# Patient Record
Sex: MALE | Race: WHITE | NOT HISPANIC OR LATINO | Employment: OTHER | URBAN - NONMETROPOLITAN AREA
[De-identification: names, ages, dates, MRNs, and addresses within clinical notes are randomized per-mention and may not be internally consistent; named-entity substitution may affect disease eponyms.]

---

## 2023-12-27 PROBLEM — J44.9 COPD (CHRONIC OBSTRUCTIVE PULMONARY DISEASE) (MULTI): Status: ACTIVE | Noted: 2023-12-27

## 2023-12-27 PROBLEM — I47.10 PAROXYSMAL SVT (SUPRAVENTRICULAR TACHYCARDIA) (CMS-HCC): Status: ACTIVE | Noted: 2023-12-27

## 2023-12-27 PROBLEM — I10 ESSENTIAL HYPERTENSION: Status: ACTIVE | Noted: 2023-12-27

## 2023-12-27 RX ORDER — CLOPIDOGREL BISULFATE 75 MG/1
75 TABLET ORAL DAILY
COMMUNITY

## 2023-12-27 RX ORDER — OMEPRAZOLE 20 MG/1
20 CAPSULE, DELAYED RELEASE ORAL
COMMUNITY

## 2023-12-27 RX ORDER — CLONAZEPAM 0.5 MG/1
1 TABLET ORAL 3 TIMES DAILY
COMMUNITY

## 2023-12-27 RX ORDER — SILDENAFIL CITRATE 20 MG/1
100 TABLET ORAL AS NEEDED
COMMUNITY

## 2023-12-27 RX ORDER — ALOGLIPTIN 25 MG/1
25 TABLET, FILM COATED ORAL DAILY
COMMUNITY

## 2023-12-27 RX ORDER — ROSUVASTATIN CALCIUM 20 MG/1
20 TABLET, COATED ORAL DAILY
COMMUNITY

## 2023-12-27 RX ORDER — FERROUS SULFATE 325(65) MG
1 TABLET ORAL EVERY OTHER DAY
COMMUNITY

## 2023-12-27 RX ORDER — LISINOPRIL 20 MG/1
10 TABLET ORAL DAILY
COMMUNITY

## 2023-12-27 RX ORDER — TAMSULOSIN HYDROCHLORIDE 0.4 MG/1
0.4 CAPSULE ORAL DAILY
COMMUNITY
End: 2024-02-02

## 2023-12-27 RX ORDER — FLUTICASONE PROPIONATE 50 MCG
1 SPRAY, SUSPENSION (ML) NASAL DAILY
COMMUNITY

## 2023-12-27 RX ORDER — AMLODIPINE BESYLATE 10 MG/1
10 TABLET ORAL DAILY
COMMUNITY
End: 2024-02-02

## 2023-12-27 RX ORDER — FINASTERIDE 5 MG/1
5 TABLET, FILM COATED ORAL DAILY
COMMUNITY

## 2023-12-27 RX ORDER — ALBUTEROL SULFATE 90 UG/1
2 AEROSOL, METERED RESPIRATORY (INHALATION)
COMMUNITY

## 2024-01-11 ENCOUNTER — TELEPHONE (OUTPATIENT)
Dept: CARDIOLOGY | Facility: CLINIC | Age: 74
End: 2024-01-11
Payer: OTHER GOVERNMENT

## 2024-01-17 ENCOUNTER — APPOINTMENT (OUTPATIENT)
Dept: CARDIOLOGY | Facility: CLINIC | Age: 74
End: 2024-01-17
Payer: MEDICARE

## 2024-02-02 ENCOUNTER — OFFICE VISIT (OUTPATIENT)
Dept: CARDIOLOGY | Facility: CLINIC | Age: 74
End: 2024-02-02
Payer: MEDICARE

## 2024-02-02 VITALS
SYSTOLIC BLOOD PRESSURE: 120 MMHG | WEIGHT: 190 LBS | DIASTOLIC BLOOD PRESSURE: 80 MMHG | HEART RATE: 72 BPM | HEIGHT: 73 IN | BODY MASS INDEX: 25.18 KG/M2

## 2024-02-02 DIAGNOSIS — E78.2 MIXED HYPERLIPIDEMIA: ICD-10-CM

## 2024-02-02 DIAGNOSIS — E13.9 DIABETES MELLITUS OF OTHER TYPE WITHOUT COMPLICATION, UNSPECIFIED WHETHER LONG TERM INSULIN USE (MULTI): ICD-10-CM

## 2024-02-02 DIAGNOSIS — I65.23 CAROTID STENOSIS, BILATERAL: ICD-10-CM

## 2024-02-02 DIAGNOSIS — F17.200 CURRENT SMOKER: ICD-10-CM

## 2024-02-02 DIAGNOSIS — I47.10 PAROXYSMAL SVT (SUPRAVENTRICULAR TACHYCARDIA) (CMS-HCC): ICD-10-CM

## 2024-02-02 DIAGNOSIS — I10 ESSENTIAL HYPERTENSION: ICD-10-CM

## 2024-02-02 PROBLEM — E11.9 DIABETES MELLITUS (MULTI): Status: ACTIVE | Noted: 2024-02-02

## 2024-02-02 PROBLEM — R93.1 ABNORMAL ECHOCARDIOGRAM: Status: ACTIVE | Noted: 2024-02-02

## 2024-02-02 PROCEDURE — 3079F DIAST BP 80-89 MM HG: CPT | Performed by: INTERNAL MEDICINE

## 2024-02-02 PROCEDURE — 1159F MED LIST DOCD IN RCRD: CPT | Performed by: INTERNAL MEDICINE

## 2024-02-02 PROCEDURE — 99214 OFFICE O/P EST MOD 30 MIN: CPT | Performed by: INTERNAL MEDICINE

## 2024-02-02 PROCEDURE — 3074F SYST BP LT 130 MM HG: CPT | Performed by: INTERNAL MEDICINE

## 2024-02-02 PROCEDURE — 4010F ACE/ARB THERAPY RXD/TAKEN: CPT | Performed by: INTERNAL MEDICINE

## 2024-02-02 NOTE — PROGRESS NOTES
"Subjective   Jake Graves is a 73 y.o. male       Chief Complaint    Follow-up          HPI   Patient has seen previously Dr. Hebert and this is the first time I see him in the office.  I take care of his wife Pietro.  He has remote history of SVT with no recurrences has no organic heart disease based on previous echocardiogram.  He has no symptoms of chest pain orthopnea PND or lower extremity edema.  He is heavy tobacco user up to 2 packs daily.  He used to be in the .  He has no desire to quit smoking.  Apparently has occluded right internal carotid artery leading to stroke in the past and he follows with vascular surgery for that.  His pressures under excellent control.  His lipids have been managed by his PCP Dr. Roberto de leon.  His lungs sounded normal and his cardiac examination was normal.  He had no carotid bruits.    Assessment/recommendations:    1-history of SVT in the past self-limited with no recurrences.  Will monitor for now  2-history of stroke caused by right carotid occlusion followed by vascular surgery with mild residual deficit.  He is on antiplatelet therapy and high intensity statin.  3-active tobacco use.  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  5-hyperlipidemia on high intensity statin followed by PCP.  Lab data from recent testing were requested  Review of Systems   All other systems reviewed and are negative.         Visit Vitals  /80 (BP Location: Left arm, Patient Position: Sitting)   Pulse 72   Ht 1.854 m (6' 1\")   Wt 86.2 kg (190 lb)   BMI 25.07 kg/m²   Smoking Status Every Day   BSA 2.11 m²         Objective   Physical Exam  Constitutional:       Appearance: Normal appearance. He is normal weight.   HENT:      Nose: Nose normal.   Neck:      Vascular: No carotid bruit.   Cardiovascular:      Rate and Rhythm: Normal rate.      Pulses: Normal pulses.      Heart sounds: Normal heart " sounds.   Pulmonary:      Effort: Pulmonary effort is normal.   Abdominal:      General: Bowel sounds are normal.      Palpations: Abdomen is soft.   Genitourinary:     Rectum: Normal.   Musculoskeletal:         General: Normal range of motion.      Cervical back: Normal range of motion.      Right lower leg: No edema.      Left lower leg: No edema.   Skin:     General: Skin is warm and dry.   Neurological:      General: No focal deficit present.      Mental Status: He is alert.   Psychiatric:         Mood and Affect: Mood normal.         Behavior: Behavior normal.         Thought Content: Thought content normal.         Judgment: Judgment normal.         Current Medications    Current Outpatient Medications:     albuterol (ProAir HFA) 90 mcg/actuation inhaler, Inhale 2 puffs 4 times a day., Disp: , Rfl:     alogliptin (Nesina) 25 mg tablet, Take 1 tablet (25 mg) by mouth once daily., Disp: , Rfl:     calcium citrate-vitamin D2 250 mg-2.5 mcg (100 unit) tablet, Take 1 tablet by mouth 2 times a day., Disp: , Rfl:     clonazePAM (KlonoPIN) 0.5 mg tablet, Take 1 tablet (0.5 mg) by mouth 3 times a day., Disp: , Rfl:     clopidogrel (Plavix) 75 mg tablet, Take 1 tablet (75 mg) by mouth once daily., Disp: , Rfl:     ferrous sulfate, 325 mg ferrous sulfate, tablet, Take 1 tablet by mouth every other day., Disp: , Rfl:     finasteride (Proscar) 5 mg tablet, Take 1 tablet (5 mg) by mouth once daily., Disp: , Rfl:     fish oil concentrate (Omega-3) 120-180 mg capsule, Take 1 capsule (1 g) by mouth once daily., Disp: , Rfl:     fluticasone (Flonase) 50 mcg/actuation nasal spray, Administer 1 spray into each nostril once daily., Disp: , Rfl:     lisinopril 20 mg tablet, Take 0.5 tablets (10 mg) by mouth once daily., Disp: , Rfl:     multivitamin-Ca-iron-minerals (Tab-A-Annia Womens) 27-0.4 mg tablet, Take 1 tablet by mouth once daily., Disp: , Rfl:     omeprazole (PriLOSEC) 20 mg DR capsule, Take 1 capsule (20 mg) by mouth once  daily in the morning. Take before meals., Disp: , Rfl:     rosuvastatin (Crestor) 20 mg tablet, Take 1 tablet (20 mg) by mouth once daily., Disp: , Rfl:     sildenafil (Revatio) 20 mg tablet, Take 5 tablets (100 mg) by mouth if needed., Disp: , Rfl:                      Assessment/Plan   1. Paroxysmal SVT (supraventricular tachycardia)  Follow Up In Cardiology      2. Essential hypertension  Follow Up In Cardiology      3. Carotid stenosis, bilateral        4. Mixed hyperlipidemia        5. Diabetes mellitus of other type without complication, unspecified whether long term insulin use (CMS/[facility])        6. Current smoker             Scribe Attestation  By signing my name below, Bree COPELAND LPN  , Scribe   attest that this documentation has been prepared under the direction and in the presence of Phill Johnson MD.

## 2024-02-02 NOTE — LETTER
February 2, 2024     Roberto De Leon DO  [address] [facility] [address] [facility] [address]  [facility] [address]    Patient: Jake Graves   YOB: 1950   Date of Visit: 2/2/2024       Dear Dr. Roberto De Leon DO:    Thank you for referring Jake Graves to me for evaluation. Below are my notes for this consultation.  If you have questions, please do not hesitate to call me. I look forward to following your patient along with you.       Sincerely,     Phill Johnson MD      CC: No Recipients  ______________________________________________________________________________________    Subjective   Jake Graves is a 73 y.o. male       Chief Complaint    Follow-up          HPI   Patient has seen previously Dr. Hebert and this is the first time I see him in the office.  I take care of his wife Pietro.  He has remote history of SVT with no recurrences has no organic heart disease based on previous echocardiogram.  He has no symptoms of chest pain orthopnea PND or lower extremity edema.  He is heavy tobacco user up to 2 packs daily.  He used to be in the .  He has no desire to quit smoking.  Apparently has occluded right internal carotid artery leading to stroke in the past and he follows with vascular surgery for that.  His pressures under excellent control.  His lipids have been managed by his PCP Dr. Roberto de leon.  His lungs sounded normal and his cardiac examination was normal.  He had no carotid bruits.    Assessment/recommendations:    1-history of SVT in the past self-limited with no recurrences.  Will monitor for now  2-history of stroke caused by right carotid occlusion followed by vascular surgery with mild residual deficit.  He is on antiplatelet therapy and high intensity statin.  3-active tobacco use.  Patient has no desire to quit smoking knowing the risks associated with it  4-COPD mild due to tobacco abuse on inhaler  "therapy.  5-hyperlipidemia on high intensity statin followed by PCP.  Lab data from recent testing were requested  Review of Systems   All other systems reviewed and are negative.         Visit Vitals  /80 (BP Location: Left arm, Patient Position: Sitting)   Pulse 72   Ht 1.854 m (6' 1\")   Wt 86.2 kg (190 lb)   BMI 25.07 kg/m²   Smoking Status Every Day   BSA 2.11 m²         Objective   Physical Exam  Constitutional:       Appearance: Normal appearance. He is normal weight.   HENT:      Nose: Nose normal.   Neck:      Vascular: No carotid bruit.   Cardiovascular:      Rate and Rhythm: Normal rate.      Pulses: Normal pulses.      Heart sounds: Normal heart sounds.   Pulmonary:      Effort: Pulmonary effort is normal.   Abdominal:      General: Bowel sounds are normal.      Palpations: Abdomen is soft.   Genitourinary:     Rectum: Normal.   Musculoskeletal:         General: Normal range of motion.      Cervical back: Normal range of motion.      Right lower leg: No edema.      Left lower leg: No edema.   Skin:     General: Skin is warm and dry.   Neurological:      General: No focal deficit present.      Mental Status: He is alert.   Psychiatric:         Mood and Affect: Mood normal.         Behavior: Behavior normal.         Thought Content: Thought content normal.         Judgment: Judgment normal.         Current Medications    Current Outpatient Medications:   •  albuterol (ProAir HFA) 90 mcg/actuation inhaler, Inhale 2 puffs 4 times a day., Disp: , Rfl:   •  alogliptin (Nesina) 25 mg tablet, Take 1 tablet (25 mg) by mouth once daily., Disp: , Rfl:   •  calcium citrate-vitamin D2 250 mg-2.5 mcg (100 unit) tablet, Take 1 tablet by mouth 2 times a day., Disp: , Rfl:   •  clonazePAM (KlonoPIN) 0.5 mg tablet, Take 1 tablet (0.5 mg) by mouth 3 times a day., Disp: , Rfl:   •  clopidogrel (Plavix) 75 mg tablet, Take 1 tablet (75 mg) by mouth once daily., Disp: , Rfl:   •  ferrous sulfate, 325 mg ferrous sulfate, " tablet, Take 1 tablet by mouth every other day., Disp: , Rfl:   •  finasteride (Proscar) 5 mg tablet, Take 1 tablet (5 mg) by mouth once daily., Disp: , Rfl:   •  fish oil concentrate (Omega-3) 120-180 mg capsule, Take 1 capsule (1 g) by mouth once daily., Disp: , Rfl:   •  fluticasone (Flonase) 50 mcg/actuation nasal spray, Administer 1 spray into each nostril once daily., Disp: , Rfl:   •  lisinopril 20 mg tablet, Take 0.5 tablets (10 mg) by mouth once daily., Disp: , Rfl:   •  multivitamin-Ca-iron-minerals (Tab-A-Annia Womens) 27-0.4 mg tablet, Take 1 tablet by mouth once daily., Disp: , Rfl:   •  omeprazole (PriLOSEC) 20 mg DR capsule, Take 1 capsule (20 mg) by mouth once daily in the morning. Take before meals., Disp: , Rfl:   •  rosuvastatin (Crestor) 20 mg tablet, Take 1 tablet (20 mg) by mouth once daily., Disp: , Rfl:   •  sildenafil (Revatio) 20 mg tablet, Take 5 tablets (100 mg) by mouth if needed., Disp: , Rfl:                      Assessment/Plan   1. Paroxysmal SVT (supraventricular tachycardia)  Follow Up In Cardiology      2. Essential hypertension  Follow Up In Cardiology      3. Carotid stenosis, bilateral        4. Mixed hyperlipidemia        5. Diabetes mellitus of other type without complication, unspecified whether long term insulin use (CMS/[facility])        6. Current smoker             Scribe Attestation  By signing my name below, Bree COPELAND LPN  , Scribe   attest that this documentation has been prepared under the direction and in the presence of Phill Johnson MD.

## 2025-02-05 ENCOUNTER — APPOINTMENT (OUTPATIENT)
Dept: CARDIOLOGY | Facility: CLINIC | Age: 75
End: 2025-02-05
Payer: MEDICARE

## 2025-05-05 ENCOUNTER — APPOINTMENT (OUTPATIENT)
Dept: CARDIOLOGY | Facility: CLINIC | Age: 75
End: 2025-05-05
Payer: MEDICARE

## 2025-05-05 VITALS
SYSTOLIC BLOOD PRESSURE: 106 MMHG | DIASTOLIC BLOOD PRESSURE: 64 MMHG | WEIGHT: 180.6 LBS | HEIGHT: 73 IN | HEART RATE: 68 BPM | BODY MASS INDEX: 23.94 KG/M2

## 2025-05-05 DIAGNOSIS — I65.23 CAROTID STENOSIS, BILATERAL: ICD-10-CM

## 2025-05-05 DIAGNOSIS — E13.9 DIABETES MELLITUS OF OTHER TYPE WITHOUT COMPLICATION, UNSPECIFIED WHETHER LONG TERM INSULIN USE: ICD-10-CM

## 2025-05-05 DIAGNOSIS — I10 ESSENTIAL HYPERTENSION: ICD-10-CM

## 2025-05-05 DIAGNOSIS — E78.2 MIXED HYPERLIPIDEMIA: ICD-10-CM

## 2025-05-05 DIAGNOSIS — I47.10 PAROXYSMAL SVT (SUPRAVENTRICULAR TACHYCARDIA) (CMS-HCC): ICD-10-CM

## 2025-05-05 DIAGNOSIS — F17.200 CURRENT SMOKER: ICD-10-CM

## 2025-05-05 PROCEDURE — 3074F SYST BP LT 130 MM HG: CPT | Performed by: INTERNAL MEDICINE

## 2025-05-05 PROCEDURE — 4004F PT TOBACCO SCREEN RCVD TLK: CPT | Performed by: INTERNAL MEDICINE

## 2025-05-05 PROCEDURE — 99214 OFFICE O/P EST MOD 30 MIN: CPT | Performed by: INTERNAL MEDICINE

## 2025-05-05 PROCEDURE — 3008F BODY MASS INDEX DOCD: CPT | Performed by: INTERNAL MEDICINE

## 2025-05-05 PROCEDURE — 4010F ACE/ARB THERAPY RXD/TAKEN: CPT | Performed by: INTERNAL MEDICINE

## 2025-05-05 PROCEDURE — 3078F DIAST BP <80 MM HG: CPT | Performed by: INTERNAL MEDICINE

## 2025-05-05 PROCEDURE — 1159F MED LIST DOCD IN RCRD: CPT | Performed by: INTERNAL MEDICINE

## 2025-05-05 RX ORDER — ASPIRIN 81 MG/1
81 TABLET ORAL EVERY OTHER DAY
COMMUNITY

## 2025-05-05 RX ORDER — CHOLECALCIFEROL (VITAMIN D3) 25 MCG
25 TABLET ORAL DAILY
COMMUNITY

## 2025-05-05 RX ORDER — EZETIMIBE 10 MG/1
10 TABLET ORAL DAILY
COMMUNITY

## 2025-05-05 RX ORDER — DORZOLAMIDE HCL 20 MG/ML
1 SOLUTION/ DROPS OPHTHALMIC 3 TIMES DAILY
COMMUNITY

## 2025-05-05 NOTE — LETTER
May 5, 2025     Roberto Anaya DO  [address]  [place] [facility]- [facility] [address] [facility] [address]  [facility] [address]    Patient: Jake Graves   YOB: 1950   Date of Visit: 5/5/2025       Dear Dr. Roberto Anaya DO:    Thank you for referring Jake Graves to me for evaluation. Below are my notes for this consultation.  If you have questions, please do not hesitate to call me. I look forward to following your patient along with you.       Sincerely,     Phill Johnson MD      CC: No Recipients  ______________________________________________________________________________________    Chief Complaint   Patient presents with   • Annual Exam     1 year, paroxysmal supraventricular tachycardia         Subjective   Jake Graves is a 74 y.o. male     HPI   Patient is in the office with his wife for follow-up for history of SVT, he has had no recurrences since he was last seen.  He does have carotid disease and follows annually with vascular surgery with annual carotid scans and there has been no progression.  He is on hyperlipidemic therapy with combination of statin and ezetimibe.  [facility] and he was told [facility] was okay.  He continued to smoke heavily more than a pack daily and has no desire to quit smoking.  His lungs sounded normal.  His card examination was normal.  His blood pressure is normal.  Due to increasing bruising while he is on a combination therapy of aspirin and Plavix we will reduce aspirin to every other day.    Assessment/recommendations:     1-history of SVT in the past self-limited with no recurrences.  Will monitor for now  2-history of stroke caused by right carotid occlusion followed by vascular surgery with mild residual deficit.  He is on antiplatelet therapy and high intensity statin.  3-active tobacco use.  Patient has no desire to quit smoking knowing the risks associated with it  4-  "hyperlipidemia on high intensity statin and ezetimibe, [facility] were [facility]  5-type 2 diabetes on [facility]ia [facility]  6-essential hypertension on lisinopril under control  Review of Systems   All other systems reviewed and are negative.           Vitals:    05/05/25 1357   BP: 106/64   BP Location: Right arm   Patient Position: Sitting   Pulse: 68   Weight: 81.9 kg (180 lb 9.6 oz)   Height: 1.854 m (6' 1\")        Objective   Physical Exam  Constitutional:       Appearance: Normal appearance.   HENT:      Nose: Nose normal.   Neck:      Vascular: No carotid bruit.   Cardiovascular:      Rate and Rhythm: Normal rate.      Pulses: Normal pulses.      Heart sounds: Normal heart sounds.   Pulmonary:      Effort: Pulmonary effort is normal.   Abdominal:      General: Bowel sounds are normal.      Palpations: Abdomen is soft.   Musculoskeletal:         General: Normal range of motion.      Cervical back: Normal range of motion.      Right lower leg: No edema.      Left lower leg: No edema.   Skin:     General: Skin is warm and dry.   Neurological:      General: No focal deficit present.      Mental Status: He is alert.   Psychiatric:         Mood and Affect: Mood normal.         Behavior: Behavior normal.         Thought Content: Thought content normal.         Judgment: Judgment normal.         Allergies  Rosuvastatin     Current Medications  Current Outpatient Medications   Medication Instructions   • albuterol (ProAir HFA) 90 mcg/actuation inhaler 2 puffs, 4 times daily RT   • aspirin 81 mg, Every other day   • cholecalciferol (VITAMIN D3) 25 mcg, Daily   • clonazePAM (KlonoPIN) 0.5 mg tablet 1 tablet, 2 times daily   • clopidogrel (PLAVIX) 75 mg, Daily   • dorzolamide (Trusopt) 2 % ophthalmic solution 1 drop, 3 times daily   • ezetimibe (ZETIA) 10 mg, Daily   • ferrous sulfate, 325 mg ferrous sulfate, tablet 1 tablet, Every other day   • finasteride (PROSCAR) 5 mg, Daily   • fluticasone " (Flonase) 50 mcg/actuation nasal spray 1 spray, Daily   • lisinopril 10 mg, Daily   • omeprazole (PRILOSEC) 20 mg, Daily before breakfast   • rosuvastatin (CRESTOR) 20 mg, Daily   • sildenafil (REVATIO) 20 mg, As needed   • SITagliptin phosphate (JANUVIA) 100 mg, Daily                        Assessment/Plan   1. Paroxysmal SVT (supraventricular tachycardia) (CMS-HCC)  Follow Up In Cardiology    Follow Up In Cardiology      2. Essential hypertension  Follow Up In Cardiology    Follow Up In Cardiology      3. Carotid stenosis, bilateral        4. Mixed hyperlipidemia        5. Diabetes mellitus of other type without complication, unspecified whether long term insulin use        6. Current smoker        7. BMI 23.0-23.9, adult                 Scribe Attestation  By signing my name below, I, oLlly MANNING RN   , Scribe   attest that this documentation has been prepared under the direction and in the presence of Phill Johnson MD.     Provider Attestation - Scribe documentation    All medical record entries made by the Scribe were at my direction and personally dictated by me. I have reviewed the chart and agree that the record accurately reflects my personal performance of the history, physical exam, discussion and plan.

## 2025-05-05 NOTE — PROGRESS NOTES
"Chief Complaint   Patient presents with    Annual Exam     1 year, paroxysmal supraventricular tachycardia         Subjective   Jake Graves is a 74 y.o. male     HPI   Patient is in the office with his wife for follow-up for history of SVT, he has had no recurrences since he was last seen.  He does have carotid disease and follows annually with vascular surgery with annual carotid scans and there has been no progression.  He is on hyperlipidemic therapy with combination of statin and ezetimibe.  [facility] and he was told everything was okay.  He continued to smoke heavily more than a pack daily and has no desire to quit smoking.  His lungs sounded normal.  His card examination was normal.  His blood pressure is normal.  Due to increasing bruising while he is on a combination therapy of aspirin and Plavix we will reduce aspirin to every other day.    Assessment/recommendations:     1-history of SVT in the past self-limited with no recurrences.  Will monitor for now  2-history of stroke caused by right carotid occlusion followed by vascular surgery with mild residual deficit.  He is on antiplatelet therapy and high intensity statin.  3-active tobacco use.  Patient has no desire to quit smoking knowing the risks associated with it  4- hyperlipidemia on high intensity statin and ezetimibe, [facility] were [facility]  5-type 2 diabetes [facility]ia [facility]  6-essential hypertension on lisinopril under control  Review of Systems   All other systems reviewed and are negative.           Vitals:    05/05/25 1357   BP: 106/64   BP Location: Right arm   Patient Position: Sitting   Pulse: 68   Weight: 81.9 kg (180 lb 9.6 oz)   Height: 1.854 m (6' 1\")        Objective   Physical Exam  Constitutional:       Appearance: Normal appearance.   HENT:      Nose: Nose normal.   Neck:      Vascular: No carotid bruit.   Cardiovascular:      Rate and Rhythm: Normal rate.    "   Pulses: Normal pulses.      Heart sounds: Normal heart sounds.   Pulmonary:      Effort: Pulmonary effort is normal.   Abdominal:      General: Bowel sounds are normal.      Palpations: Abdomen is soft.   Musculoskeletal:         General: Normal range of motion.      Cervical back: Normal range of motion.      Right lower leg: No edema.      Left lower leg: No edema.   Skin:     General: Skin is warm and dry.   Neurological:      General: No focal deficit present.      Mental Status: He is alert.   Psychiatric:         Mood and Affect: Mood normal.         Behavior: Behavior normal.         Thought Content: Thought content normal.         Judgment: Judgment normal.         Allergies  Rosuvastatin     Current Medications  Current Outpatient Medications   Medication Instructions    albuterol (ProAir HFA) 90 mcg/actuation inhaler 2 puffs, 4 times daily RT    aspirin 81 mg, Every other day    cholecalciferol (VITAMIN D3) 25 mcg, Daily    clonazePAM (KlonoPIN) 0.5 mg tablet 1 tablet, 2 times daily    clopidogrel (PLAVIX) 75 mg, Daily    dorzolamide (Trusopt) 2 % ophthalmic solution 1 drop, 3 times daily    ezetimibe (ZETIA) 10 mg, Daily    ferrous sulfate, 325 mg ferrous sulfate, tablet 1 tablet, Every other day    finasteride (PROSCAR) 5 mg, Daily    fluticasone (Flonase) 50 mcg/actuation nasal spray 1 spray, Daily    lisinopril 10 mg, Daily    omeprazole (PRILOSEC) 20 mg, Daily before breakfast    rosuvastatin (CRESTOR) 20 mg, Daily    sildenafil (REVATIO) 20 mg, As needed    SITagliptin phosphate (JANUVIA) 100 mg, Daily                        Assessment/Plan   1. Paroxysmal SVT (supraventricular tachycardia) (CMS-HCC)  Follow Up In Cardiology    Follow Up In Cardiology      2. Essential hypertension  Follow Up In Cardiology    Follow Up In Cardiology      3. Carotid stenosis, bilateral        4. Mixed hyperlipidemia        5. Diabetes mellitus of other type without complication, unspecified whether long term insulin  use        6. Current smoker        7. BMI 23.0-23.9, adult                 Scribe Attestation  By signing my name below, I, Lolly MANNING RN   , Scribe   attest that this documentation has been prepared under the direction and in the presence of Phill Johnson MD.     Provider Attestation - Scribe documentation    All medical record entries made by the Scribe were at my direction and personally dictated by me. I have reviewed the chart and agree that the record accurately reflects my personal performance of the history, physical exam, discussion and plan.

## 2026-05-08 ENCOUNTER — APPOINTMENT (OUTPATIENT)
Dept: CARDIOLOGY | Facility: CLINIC | Age: 76
End: 2026-05-08
Payer: MEDICARE